# Patient Record
Sex: MALE | Race: WHITE | Employment: OTHER | URBAN - METROPOLITAN AREA
[De-identification: names, ages, dates, MRNs, and addresses within clinical notes are randomized per-mention and may not be internally consistent; named-entity substitution may affect disease eponyms.]

---

## 2018-11-08 ENCOUNTER — HOSPITAL ENCOUNTER (OUTPATIENT)
Age: 73
Setting detail: OUTPATIENT SURGERY
Discharge: HOME | DRG: 862 | End: 2018-11-08
Attending: UROLOGY | Admitting: UROLOGY
Payer: MEDICARE

## 2018-11-08 ENCOUNTER — HOSPITAL ENCOUNTER (OUTPATIENT)
Dept: ULTRASOUND IMAGING | Age: 73
Discharge: HOME | DRG: 862 | End: 2018-11-10
Payer: MEDICARE

## 2018-11-08 VITALS
WEIGHT: 200.62 LBS | SYSTOLIC BLOOD PRESSURE: 159 MMHG | HEIGHT: 69 IN | RESPIRATION RATE: 16 BRPM | HEART RATE: 96 BPM | OXYGEN SATURATION: 96 % | BODY MASS INDEX: 29.71 KG/M2 | TEMPERATURE: 98.2 F | DIASTOLIC BLOOD PRESSURE: 68 MMHG

## 2018-11-08 DIAGNOSIS — R97.20 ELEVATED PSA: ICD-10-CM

## 2018-11-08 PROCEDURE — 3600000002 HC SURGERY LEVEL 2 BASE: Performed by: UROLOGY

## 2018-11-08 PROCEDURE — 2709999900 HC NON-CHARGEABLE SUPPLY: Performed by: UROLOGY

## 2018-11-08 PROCEDURE — 88305 TISSUE EXAM BY PATHOLOGIST: CPT

## 2018-11-08 PROCEDURE — 7100000010 HC PHASE II RECOVERY - FIRST 15 MIN: Performed by: UROLOGY

## 2018-11-08 PROCEDURE — 3600000012 HC SURGERY LEVEL 2 ADDTL 15MIN: Performed by: UROLOGY

## 2018-11-08 PROCEDURE — 2500000003 HC RX 250 WO HCPCS: Performed by: UROLOGY

## 2018-11-08 PROCEDURE — 7100000011 HC PHASE II RECOVERY - ADDTL 15 MIN: Performed by: UROLOGY

## 2018-11-08 PROCEDURE — 55700 US BIOPSY PROSTATE NEEDLE/PUNCH: CPT

## 2018-11-08 PROCEDURE — 0VB03ZX EXCISION OF PROSTATE, PERCUTANEOUS APPROACH, DIAGNOSTIC: ICD-10-PCS | Performed by: UROLOGY

## 2018-11-08 PROCEDURE — 6360000002 HC RX W HCPCS: Performed by: UROLOGY

## 2018-11-08 RX ORDER — GENTAMICIN SULFATE 40 MG/ML
80 INJECTION, SOLUTION INTRAMUSCULAR; INTRAVENOUS ONCE
Status: COMPLETED | OUTPATIENT
Start: 2018-11-08 | End: 2018-11-08

## 2018-11-08 RX ORDER — DOXAZOSIN 2 MG/1
TABLET ORAL
COMMUNITY

## 2018-11-08 RX ORDER — LIDOCAINE HYDROCHLORIDE 10 MG/ML
INJECTION, SOLUTION EPIDURAL; INFILTRATION; INTRACAUDAL; PERINEURAL PRN
Status: DISCONTINUED | OUTPATIENT
Start: 2018-11-08 | End: 2018-11-08 | Stop reason: HOSPADM

## 2018-11-08 RX ORDER — ATORVASTATIN CALCIUM 10 MG/1
TABLET, FILM COATED ORAL
COMMUNITY

## 2018-11-08 RX ADMIN — GENTAMICIN SULFATE 80 MG: 40 INJECTION, SOLUTION INTRAMUSCULAR; INTRAVENOUS at 09:56

## 2018-11-08 ASSESSMENT — PAIN SCALES - GENERAL: PAINLEVEL_OUTOF10: 0

## 2018-11-08 ASSESSMENT — PAIN - FUNCTIONAL ASSESSMENT: PAIN_FUNCTIONAL_ASSESSMENT: 0-10

## 2018-11-08 NOTE — OP NOTE
FACILITY:  [address] [place] [address]  DATE:  11/08/18  Chrissy Mendoza  1945  [id number]     Surgeon: Dr. Hunter Monaco MD  Assistant: none  Preoperative diagnosis: Elevated prostatic specific antigen. Postoperative diagnosis: Elevated prostatic specific antigen. Procedure: Transrectal ultrasound of the prostate with prostate biopsy. Anesthesia: MAC and local  Specimen: Prostate biopsy specimens  COMPLICATIONS:  None. Drains: None  Follow-up: 1-2 weeks for pathology    Indication:  Chrissy Mendoza is a 71 y.o. male  with PSA of 15.61. He is here today for biopsy of the prostate. Risks benefits and alternatives goals and possible complications of the procedure were explained to the patient for consent was obtained. He elected to proceed. Details: Patient was brought back to the operating room table. He was laid  in the supine position. EPC cuffs were placed on and functioning prior to  induction of anesthesia. Anesthesia was inducted. A timeout performed. The ultrasound probe was placed per rectum. The prostate was brought into view. His prostate was measured at 88 gm. Seminal vesicles appeared  normal.  We used 1 percent lidocaine to inject around the neurovascular  bundle bilaterally. We then proceeded with a standard sextant biopsy  starting on the right side in the left side for a total of 12 specimens. There was small hypoechoic nodule on right side. These core specimens were sent off for permanent pathology. After completion of the biopsy we then removed  the ultrasound probe. There is no evidence of brisk bleeding per the  rectum. The patient tolerated the procedure well. His anesthesia was  reversed. He was then taken to recovery in stable condition. He was  discharged home in stable condition and instructed to follow-up for review  of his pathology results. He is instructed to call if he has any fevers  shaking chills.   The attending was present for

## 2018-11-09 ENCOUNTER — APPOINTMENT (OUTPATIENT)
Dept: GENERAL RADIOLOGY | Age: 73
DRG: 862 | End: 2018-11-09
Payer: MEDICARE

## 2018-11-09 ENCOUNTER — HOSPITAL ENCOUNTER (INPATIENT)
Age: 73
LOS: 3 days | Discharge: HOME HEALTH | DRG: 862 | End: 2018-11-12
Attending: EMERGENCY MEDICINE | Admitting: FAMILY MEDICINE
Payer: MEDICARE

## 2018-11-09 DIAGNOSIS — R78.81 BACTEREMIA: Primary | ICD-10-CM

## 2018-11-09 DIAGNOSIS — N17.9 ACUTE KIDNEY INJURY (HCC): ICD-10-CM

## 2018-11-09 PROBLEM — T81.44XA: Status: ACTIVE | Noted: 2018-11-09

## 2018-11-09 PROBLEM — E87.20 LACTIC ACIDOSIS: Status: ACTIVE | Noted: 2018-11-09

## 2018-11-09 LAB
-: ABNORMAL
ABSOLUTE EOS #: 0 K/UL (ref 0–0.4)
ABSOLUTE IMMATURE GRANULOCYTE: ABNORMAL K/UL (ref 0–0.3)
ABSOLUTE LYMPH #: 0.5 K/UL (ref 1–4.8)
ABSOLUTE MONO #: 0 K/UL (ref 0.2–0.8)
ALBUMIN SERPL-MCNC: 4.7 G/DL (ref 3.5–5.2)
ALBUMIN/GLOBULIN RATIO: NORMAL (ref 1–2.5)
ALP BLD-CCNC: 60 U/L (ref 40–129)
ALT SERPL-CCNC: 17 U/L (ref 5–41)
AMORPHOUS: ABNORMAL
ANION GAP SERPL CALCULATED.3IONS-SCNC: 17 MMOL/L (ref 9–17)
AST SERPL-CCNC: 20 U/L
BACTERIA: ABNORMAL
BASOPHILS # BLD: 0 % (ref 0–2)
BASOPHILS ABSOLUTE: 0 K/UL (ref 0–0.2)
BILIRUB SERPL-MCNC: 0.8 MG/DL (ref 0.3–1.2)
BILIRUBIN DIRECT: 0.18 MG/DL
BILIRUBIN URINE: NEGATIVE
BILIRUBIN, INDIRECT: 0.62 MG/DL (ref 0–1)
BUN BLDV-MCNC: 26 MG/DL (ref 8–23)
BUN/CREAT BLD: 15 (ref 9–20)
CALCIUM SERPL-MCNC: 9.1 MG/DL (ref 8.6–10.4)
CASTS UA: ABNORMAL /LPF
CHLORIDE BLD-SCNC: 94 MMOL/L (ref 98–107)
CO2: 21 MMOL/L (ref 20–31)
COLOR: YELLOW
COMMENT UA: ABNORMAL
CREAT SERPL-MCNC: 1.73 MG/DL (ref 0.7–1.2)
CRYSTALS, UA: ABNORMAL /HPF
DIFFERENTIAL TYPE: ABNORMAL
EKG ATRIAL RATE: 128 BPM
EKG P AXIS: 49 DEGREES
EKG P-R INTERVAL: 150 MS
EKG Q-T INTERVAL: 298 MS
EKG QRS DURATION: 100 MS
EKG QTC CALCULATION (BAZETT): 435 MS
EKG R AXIS: -73 DEGREES
EKG T AXIS: 82 DEGREES
EKG VENTRICULAR RATE: 128 BPM
EOSINOPHILS RELATIVE PERCENT: 0 % (ref 1–4)
EPITHELIAL CELLS UA: ABNORMAL /HPF (ref 0–5)
GFR AFRICAN AMERICAN: 47 ML/MIN
GFR NON-AFRICAN AMERICAN: 39 ML/MIN
GFR SERPL CREATININE-BSD FRML MDRD: ABNORMAL ML/MIN/{1.73_M2}
GFR SERPL CREATININE-BSD FRML MDRD: ABNORMAL ML/MIN/{1.73_M2}
GLOBULIN: NORMAL G/DL (ref 1.5–3.8)
GLUCOSE BLD-MCNC: 163 MG/DL (ref 70–99)
GLUCOSE URINE: NEGATIVE
HCT VFR BLD CALC: 43.2 % (ref 41–53)
HEMOGLOBIN: 15 G/DL (ref 13.5–17.5)
IMMATURE GRANULOCYTES: ABNORMAL %
KETONES, URINE: NEGATIVE
LACTIC ACID, SEPSIS WHOLE BLOOD: ABNORMAL MMOL/L (ref 0.5–1.9)
LACTIC ACID, SEPSIS WHOLE BLOOD: NORMAL MMOL/L (ref 0.5–1.9)
LACTIC ACID, SEPSIS: 1.2 MMOL/L (ref 0.5–1.9)
LACTIC ACID, SEPSIS: 4.6 MMOL/L (ref 0.5–1.9)
LEUKOCYTE ESTERASE, URINE: ABNORMAL
LYMPHOCYTES # BLD: 4 % (ref 24–44)
MCH RBC QN AUTO: 30.1 PG (ref 26–34)
MCHC RBC AUTO-ENTMCNC: 34.7 G/DL (ref 31–37)
MCV RBC AUTO: 86.7 FL (ref 80–100)
MONOCYTES # BLD: 0 % (ref 1–7)
MUCUS: ABNORMAL
NITRITE, URINE: NEGATIVE
NRBC AUTOMATED: ABNORMAL PER 100 WBC
OTHER OBSERVATIONS UA: ABNORMAL
PDW BLD-RTO: 14 % (ref 11.5–14.5)
PH UA: 5 (ref 5–8)
PLATELET # BLD: 200 K/UL (ref 130–400)
PLATELET ESTIMATE: ABNORMAL
PMV BLD AUTO: 8.2 FL (ref 6–12)
POTASSIUM SERPL-SCNC: 4.2 MMOL/L (ref 3.7–5.3)
PROTEIN UA: ABNORMAL
RBC # BLD: 4.99 M/UL (ref 4.5–5.9)
RBC # BLD: ABNORMAL 10*6/UL
RBC UA: ABNORMAL /HPF (ref 0–2)
RENAL EPITHELIAL, UA: ABNORMAL /HPF
SEG NEUTROPHILS: 96 % (ref 36–66)
SEGMENTED NEUTROPHILS ABSOLUTE COUNT: 12 K/UL (ref 1.8–7.7)
SODIUM BLD-SCNC: 132 MMOL/L (ref 135–144)
SPECIFIC GRAVITY UA: 1.03 (ref 1–1.03)
SURGICAL PATHOLOGY REPORT: NORMAL
TOTAL PROTEIN: 7 G/DL (ref 6.4–8.3)
TRICHOMONAS: ABNORMAL
TROPONIN INTERP: NORMAL
TROPONIN T: <0.03 NG/ML
TURBIDITY: CLEAR
URINE HGB: ABNORMAL
UROBILINOGEN, URINE: NORMAL
WBC # BLD: 12.6 K/UL (ref 3.5–11)
WBC # BLD: ABNORMAL 10*3/UL
WBC UA: ABNORMAL /HPF (ref 0–5)
YEAST: ABNORMAL

## 2018-11-09 PROCEDURE — 83605 ASSAY OF LACTIC ACID: CPT

## 2018-11-09 PROCEDURE — 6370000000 HC RX 637 (ALT 250 FOR IP): Performed by: FAMILY MEDICINE

## 2018-11-09 PROCEDURE — 80076 HEPATIC FUNCTION PANEL: CPT

## 2018-11-09 PROCEDURE — 96375 TX/PRO/DX INJ NEW DRUG ADDON: CPT

## 2018-11-09 PROCEDURE — 6360000002 HC RX W HCPCS: Performed by: EMERGENCY MEDICINE

## 2018-11-09 PROCEDURE — 96361 HYDRATE IV INFUSION ADD-ON: CPT

## 2018-11-09 PROCEDURE — 2580000003 HC RX 258: Performed by: NURSE PRACTITIONER

## 2018-11-09 PROCEDURE — 87086 URINE CULTURE/COLONY COUNT: CPT

## 2018-11-09 PROCEDURE — 87088 URINE BACTERIA CULTURE: CPT

## 2018-11-09 PROCEDURE — 87040 BLOOD CULTURE FOR BACTERIA: CPT

## 2018-11-09 PROCEDURE — 2580000003 HC RX 258: Performed by: FAMILY MEDICINE

## 2018-11-09 PROCEDURE — 99285 EMERGENCY DEPT VISIT HI MDM: CPT

## 2018-11-09 PROCEDURE — 6360000002 HC RX W HCPCS: Performed by: FAMILY MEDICINE

## 2018-11-09 PROCEDURE — 87149 DNA/RNA DIRECT PROBE: CPT

## 2018-11-09 PROCEDURE — 1200000000 HC SEMI PRIVATE

## 2018-11-09 PROCEDURE — 36415 COLL VENOUS BLD VENIPUNCTURE: CPT

## 2018-11-09 PROCEDURE — 85025 COMPLETE CBC W/AUTO DIFF WBC: CPT

## 2018-11-09 PROCEDURE — 84484 ASSAY OF TROPONIN QUANT: CPT

## 2018-11-09 PROCEDURE — 6360000002 HC RX W HCPCS: Performed by: NURSE PRACTITIONER

## 2018-11-09 PROCEDURE — 87205 SMEAR GRAM STAIN: CPT

## 2018-11-09 PROCEDURE — 93005 ELECTROCARDIOGRAM TRACING: CPT

## 2018-11-09 PROCEDURE — 80048 BASIC METABOLIC PNL TOTAL CA: CPT

## 2018-11-09 PROCEDURE — 71045 X-RAY EXAM CHEST 1 VIEW: CPT

## 2018-11-09 PROCEDURE — 99223 1ST HOSP IP/OBS HIGH 75: CPT | Performed by: FAMILY MEDICINE

## 2018-11-09 PROCEDURE — 96365 THER/PROPH/DIAG IV INF INIT: CPT

## 2018-11-09 PROCEDURE — 2580000003 HC RX 258: Performed by: EMERGENCY MEDICINE

## 2018-11-09 PROCEDURE — 87186 SC STD MICRODIL/AGAR DIL: CPT

## 2018-11-09 PROCEDURE — 81001 URINALYSIS AUTO W/SCOPE: CPT

## 2018-11-09 PROCEDURE — 6370000000 HC RX 637 (ALT 250 FOR IP): Performed by: NURSE PRACTITIONER

## 2018-11-09 RX ORDER — CIPROFLOXACIN 2 MG/ML
400 INJECTION, SOLUTION INTRAVENOUS EVERY 12 HOURS
Status: DISCONTINUED | OUTPATIENT
Start: 2018-11-09 | End: 2018-11-11

## 2018-11-09 RX ORDER — SODIUM CHLORIDE 9 MG/ML
INJECTION, SOLUTION INTRAVENOUS CONTINUOUS
Status: DISCONTINUED | OUTPATIENT
Start: 2018-11-09 | End: 2018-11-12 | Stop reason: HOSPADM

## 2018-11-09 RX ORDER — SODIUM CHLORIDE 0.9 % (FLUSH) 0.9 %
10 SYRINGE (ML) INJECTION EVERY 12 HOURS SCHEDULED
Status: DISCONTINUED | OUTPATIENT
Start: 2018-11-09 | End: 2018-11-09

## 2018-11-09 RX ORDER — 0.9 % SODIUM CHLORIDE 0.9 %
30 INTRAVENOUS SOLUTION INTRAVENOUS ONCE
Status: COMPLETED | OUTPATIENT
Start: 2018-11-09 | End: 2018-11-09

## 2018-11-09 RX ORDER — B-COMPLEX WITH VITAMIN C
1 TABLET ORAL DAILY
COMMUNITY

## 2018-11-09 RX ORDER — SODIUM CHLORIDE 0.9 % (FLUSH) 0.9 %
10 SYRINGE (ML) INJECTION EVERY 12 HOURS SCHEDULED
Status: DISCONTINUED | OUTPATIENT
Start: 2018-11-09 | End: 2018-11-12 | Stop reason: HOSPADM

## 2018-11-09 RX ORDER — SULFAMETHOXAZOLE AND TRIMETHOPRIM 400; 80 MG/1; MG/1
1 TABLET ORAL 2 TIMES DAILY
Status: ON HOLD | COMMUNITY
End: 2018-11-12 | Stop reason: HOSPADM

## 2018-11-09 RX ORDER — ACETAMINOPHEN 500 MG
1000 TABLET ORAL ONCE
Status: COMPLETED | OUTPATIENT
Start: 2018-11-09 | End: 2018-11-09

## 2018-11-09 RX ORDER — ACETAMINOPHEN 325 MG/1
650 TABLET ORAL EVERY 4 HOURS PRN
Status: DISCONTINUED | OUTPATIENT
Start: 2018-11-09 | End: 2018-11-12 | Stop reason: HOSPADM

## 2018-11-09 RX ORDER — SODIUM CHLORIDE 0.9 % (FLUSH) 0.9 %
10 SYRINGE (ML) INJECTION PRN
Status: DISCONTINUED | OUTPATIENT
Start: 2018-11-09 | End: 2018-11-09

## 2018-11-09 RX ORDER — 0.9 % SODIUM CHLORIDE 0.9 %
1000 INTRAVENOUS SOLUTION INTRAVENOUS ONCE
Status: DISCONTINUED | OUTPATIENT
Start: 2018-11-09 | End: 2018-11-09

## 2018-11-09 RX ORDER — SODIUM CHLORIDE 0.9 % (FLUSH) 0.9 %
10 SYRINGE (ML) INJECTION PRN
Status: DISCONTINUED | OUTPATIENT
Start: 2018-11-09 | End: 2018-11-12 | Stop reason: HOSPADM

## 2018-11-09 RX ORDER — ATORVASTATIN CALCIUM 10 MG/1
10 TABLET, FILM COATED ORAL NIGHTLY
Status: DISCONTINUED | OUTPATIENT
Start: 2018-11-09 | End: 2018-11-12 | Stop reason: HOSPADM

## 2018-11-09 RX ORDER — ONDANSETRON 2 MG/ML
4 INJECTION INTRAMUSCULAR; INTRAVENOUS ONCE
Status: COMPLETED | OUTPATIENT
Start: 2018-11-09 | End: 2018-11-09

## 2018-11-09 RX ORDER — CIPROFLOXACIN 500 MG/1
500 TABLET, FILM COATED ORAL 2 TIMES DAILY
Status: ON HOLD | COMMUNITY
End: 2018-11-12 | Stop reason: HOSPADM

## 2018-11-09 RX ADMIN — ENOXAPARIN SODIUM 40 MG: 40 INJECTION SUBCUTANEOUS at 20:32

## 2018-11-09 RX ADMIN — CEFEPIME HYDROCHLORIDE 2 G: 2 INJECTION, POWDER, FOR SOLUTION INTRAVENOUS at 15:10

## 2018-11-09 RX ADMIN — CIPROFLOXACIN 400 MG: 2 INJECTION, SOLUTION INTRAVENOUS at 20:29

## 2018-11-09 RX ADMIN — VANCOMYCIN HYDROCHLORIDE 2000 MG: 1 INJECTION, POWDER, LYOPHILIZED, FOR SOLUTION INTRAVENOUS at 15:38

## 2018-11-09 RX ADMIN — Medication 10 ML: at 20:33

## 2018-11-09 RX ADMIN — SODIUM CHLORIDE 2121 ML: 9 INJECTION, SOLUTION INTRAVENOUS at 14:34

## 2018-11-09 RX ADMIN — ONDANSETRON 4 MG: 2 INJECTION, SOLUTION INTRAMUSCULAR; INTRAVENOUS at 14:34

## 2018-11-09 RX ADMIN — ATORVASTATIN CALCIUM 10 MG: 10 TABLET, FILM COATED ORAL at 20:29

## 2018-11-09 RX ADMIN — SODIUM CHLORIDE: 9 INJECTION, SOLUTION INTRAVENOUS at 17:58

## 2018-11-09 RX ADMIN — ACETAMINOPHEN 1000 MG: 500 TABLET ORAL at 14:34

## 2018-11-09 ASSESSMENT — ENCOUNTER SYMPTOMS
NAUSEA: 0
BLOOD IN STOOL: 0
COUGH: 0
BACK PAIN: 0
WHEEZING: 0
VOICE CHANGE: 0
ABDOMINAL PAIN: 0
VOMITING: 0
SORE THROAT: 0
CONSTIPATION: 0
DIARRHEA: 0
SINUS PRESSURE: 0
EYE PAIN: 0
COLOR CHANGE: 0
SHORTNESS OF BREATH: 0

## 2018-11-09 ASSESSMENT — PAIN DESCRIPTION - LOCATION
LOCATION: BACK;GENERALIZED;OTHER (COMMENT)
LOCATION: BACK

## 2018-11-09 ASSESSMENT — PAIN DESCRIPTION - PAIN TYPE: TYPE: ACUTE PAIN

## 2018-11-09 ASSESSMENT — PAIN SCALES - GENERAL
PAINLEVEL_OUTOF10: 5
PAINLEVEL_OUTOF10: 0
PAINLEVEL_OUTOF10: 0

## 2018-11-09 NOTE — H&P
[address]      HISTORY AND PHYSICAL EXAMINATION            Date:   11/9/2018  Patient name:  Bert Estrada  Date of admission:  11/9/2018  1:29 PM  MRN:   [id number]  Account:  [de-identified]  YOB: 1945  PCP:    Nuzhat Valles MD  Room:   2003/2003-02  Code Status:    Full CodeFull code    Chief Complaint:     Chief Complaint   Patient presents with    Dizziness     started  this morning    Back Pain    Nausea    Emesis       History Obtained From:     patient, spouse, electronic medical record    History of Present Illness: The patient is a 71 y.o. Non-/non  male who presents with Dizziness (started  this morning); Back Pain; Nausea; and Emesis   and he is admitted to the hospital for the management of  Sepsis following a procedure, initial encounter. Patient came to emergency room with fatigue, dizziness, nausea, vomiting for 1 day. He had prostate biopsy yesterday for elevated PSA of 17 for diagnosis. Patient started having chills, fever, dizziness, generalized weakness yesterday evening after the procedure. Patient was also having difficulty in breathing. Initial evaluation showed temperature 102.1 Fahrenheit, tachycardia 139, hypotension 100/45,lactic acid 4.6, BUN 26, creatinine 1.73, hyponatremia 132, leukocytosis 12.6. Patient is otherwise healthy and does not have significant past medical history. Past Medical History:     Past Medical History:   Diagnosis Date    Enlarged prostate     Hyperlipidemia         Past Surgical History:     Past Surgical History:   Procedure Laterality Date    CARDIAC CATHETERIZATION      LEG SURGERY Left     muscle    MS BIOPSY OF PROSTATE,INCISIONAL N/A 11/8/2018    PROSTATE BIOPSY TRANSRECTAL WITH US performed by Nilsa Darby MD at [facility]        Medications Prior to Admission:     Prior to Admission medications    Medication Sig Start Date End Date Taking?  Authorizing Provider Axis 82 degrees   Lactate, Sepsis    Collection Time: 11/09/18  2:20 PM   Result Value Ref Range    Lactic Acid, Sepsis 4.6 (H) 0.5 - 1.9 mmol/L    Lactic Acid, Sepsis, Whole Blood NOT REPORTED 0.5 - 1.9 mmol/L   CBC Auto Differential    Collection Time: 11/09/18  2:20 PM   Result Value Ref Range    WBC 12.6 (H) 3.5 - 11.0 k/uL    RBC 4.99 4.5 - 5.9 m/uL    Hemoglobin 15.0 13.5 - 17.5 g/dL    Hematocrit 43.2 41 - 53 %    MCV 86.7 80 - 100 fL    MCH 30.1 26 - 34 pg    MCHC 34.7 31 - 37 g/dL    RDW 14.0 11.5 - 14.5 %    Platelets 138 147 - 706 k/uL    MPV 8.2 6.0 - 12.0 fL    NRBC Automated NOT REPORTED per 100 WBC    Differential Type NOT REPORTED     Immature Granulocytes NOT REPORTED 0 %    Absolute Immature Granulocyte NOT REPORTED 0.00 - 0.30 k/uL    WBC Morphology NOT REPORTED     RBC Morphology NOT REPORTED     Platelet Estimate NOT REPORTED     Seg Neutrophils 96 (H) 36 - 66 %    Lymphocytes 4 (L) 24 - 44 %    Monocytes 0 (L) 1 - 7 %    Eosinophils % 0 (L) 1 - 4 %    Basophils 0 0 - 2 %    Segs Absolute 12.00 (H) 1.8 - 7.7 k/uL    Absolute Lymph # 0.50 (L) 1.0 - 4.8 k/uL    Absolute Mono # 0.00 (L) 0.2 - 0.8 k/uL    Absolute Eos # 0.00 0.0 - 0.4 k/uL    Basophils # 0.00 0.0 - 0.2 k/uL   Basic Metabolic Panel    Collection Time: 11/09/18  2:20 PM   Result Value Ref Range    Glucose 163 (H) 70 - 99 mg/dL    BUN 26 (H) 8 - 23 mg/dL    CREATININE 1.73 (H) 0.70 - 1.20 mg/dL    Bun/Cre Ratio 15 9 - 20    Calcium 9.1 8.6 - 10.4 mg/dL    Sodium 132 (L) 135 - 144 mmol/L    Potassium 4.2 3.7 - 5.3 mmol/L    Chloride 94 (L) 98 - 107 mmol/L    CO2 21 20 - 31 mmol/L    Anion Gap 17 9 - 17 mmol/L    GFR Non-African American 39 (L) >60 mL/min    GFR  47 (L) >60 mL/min    GFR Comment          GFR Staging NOT REPORTED    Hepatic Function Panel    Collection Time: 11/09/18  2:20 PM   Result Value Ref Range    Alb 4.7 3.5 - 5.2 g/dL    Alkaline Phosphatase 60 40 - 129 U/L    ALT 17 5 - 41 U/L    AST 20 <40 U/L

## 2018-11-10 PROBLEM — A41.51 E. COLI SEPSIS (HCC): Status: ACTIVE | Noted: 2018-11-10

## 2018-11-10 LAB
ALBUMIN SERPL-MCNC: 3.6 G/DL (ref 3.5–5.2)
ALBUMIN/GLOBULIN RATIO: ABNORMAL (ref 1–2.5)
ALP BLD-CCNC: 47 U/L (ref 40–129)
ALT SERPL-CCNC: 23 U/L (ref 5–41)
ANION GAP SERPL CALCULATED.3IONS-SCNC: 13 MMOL/L (ref 9–17)
AST SERPL-CCNC: 53 U/L
BILIRUB SERPL-MCNC: 0.7 MG/DL (ref 0.3–1.2)
BUN BLDV-MCNC: 21 MG/DL (ref 8–23)
BUN/CREAT BLD: 16 (ref 9–20)
CALCIUM IONIZED: 1.16 MMOL/L (ref 1.13–1.33)
CALCIUM SERPL-MCNC: 7.9 MG/DL (ref 8.6–10.4)
CHLORIDE BLD-SCNC: 102 MMOL/L (ref 98–107)
CO2: 21 MMOL/L (ref 20–31)
CREAT SERPL-MCNC: 1.3 MG/DL (ref 0.7–1.2)
GFR AFRICAN AMERICAN: >60 ML/MIN
GFR NON-AFRICAN AMERICAN: 54 ML/MIN
GFR SERPL CREATININE-BSD FRML MDRD: ABNORMAL ML/MIN/{1.73_M2}
GFR SERPL CREATININE-BSD FRML MDRD: ABNORMAL ML/MIN/{1.73_M2}
GLUCOSE BLD-MCNC: 142 MG/DL (ref 70–99)
HCT VFR BLD CALC: 34.7 % (ref 41–53)
HEMOGLOBIN: 12 G/DL (ref 13.5–17.5)
INR BLD: 1.2
LACTIC ACID, SEPSIS WHOLE BLOOD: ABNORMAL MMOL/L (ref 0.5–1.9)
LACTIC ACID, SEPSIS WHOLE BLOOD: NORMAL MMOL/L (ref 0.5–1.9)
LACTIC ACID, SEPSIS: 0.9 MMOL/L (ref 0.5–1.9)
LACTIC ACID, SEPSIS: 2.5 MMOL/L (ref 0.5–1.9)
MAGNESIUM: 1.7 MG/DL (ref 1.6–2.6)
MCH RBC QN AUTO: 30 PG (ref 26–34)
MCHC RBC AUTO-ENTMCNC: 34.5 G/DL (ref 31–37)
MCV RBC AUTO: 87.1 FL (ref 80–100)
NRBC AUTOMATED: ABNORMAL PER 100 WBC
PDW BLD-RTO: 14.3 % (ref 11.5–14.5)
PHOSPHORUS: 2 MG/DL (ref 2.5–4.5)
PLATELET # BLD: 159 K/UL (ref 130–400)
PMV BLD AUTO: 8.3 FL (ref 6–12)
POTASSIUM SERPL-SCNC: 4.3 MMOL/L (ref 3.7–5.3)
PROTHROMBIN TIME: 12.3 SEC (ref 9.7–11.6)
RBC # BLD: 3.98 M/UL (ref 4.5–5.9)
SODIUM BLD-SCNC: 136 MMOL/L (ref 135–144)
TOTAL PROTEIN: 5.9 G/DL (ref 6.4–8.3)
TROPONIN INTERP: NORMAL
TROPONIN T: <0.03 NG/ML
WBC # BLD: 13.1 K/UL (ref 3.5–11)

## 2018-11-10 PROCEDURE — 1200000000 HC SEMI PRIVATE

## 2018-11-10 PROCEDURE — 99232 SBSQ HOSP IP/OBS MODERATE 35: CPT | Performed by: FAMILY MEDICINE

## 2018-11-10 PROCEDURE — 83735 ASSAY OF MAGNESIUM: CPT

## 2018-11-10 PROCEDURE — 6360000002 HC RX W HCPCS: Performed by: FAMILY MEDICINE

## 2018-11-10 PROCEDURE — 2580000003 HC RX 258: Performed by: FAMILY MEDICINE

## 2018-11-10 PROCEDURE — 85027 COMPLETE CBC AUTOMATED: CPT

## 2018-11-10 PROCEDURE — 85610 PROTHROMBIN TIME: CPT

## 2018-11-10 PROCEDURE — 82330 ASSAY OF CALCIUM: CPT

## 2018-11-10 PROCEDURE — 84100 ASSAY OF PHOSPHORUS: CPT

## 2018-11-10 PROCEDURE — 84484 ASSAY OF TROPONIN QUANT: CPT

## 2018-11-10 PROCEDURE — 80053 COMPREHEN METABOLIC PANEL: CPT

## 2018-11-10 PROCEDURE — 6370000000 HC RX 637 (ALT 250 FOR IP): Performed by: FAMILY MEDICINE

## 2018-11-10 PROCEDURE — 83605 ASSAY OF LACTIC ACID: CPT

## 2018-11-10 PROCEDURE — 36415 COLL VENOUS BLD VENIPUNCTURE: CPT

## 2018-11-10 RX ADMIN — CIPROFLOXACIN 400 MG: 2 INJECTION, SOLUTION INTRAVENOUS at 20:22

## 2018-11-10 RX ADMIN — ENOXAPARIN SODIUM 40 MG: 40 INJECTION SUBCUTANEOUS at 09:07

## 2018-11-10 RX ADMIN — ACETAMINOPHEN 650 MG: 325 TABLET ORAL at 15:34

## 2018-11-10 RX ADMIN — ATORVASTATIN CALCIUM 10 MG: 10 TABLET, FILM COATED ORAL at 20:22

## 2018-11-10 RX ADMIN — CEFTRIAXONE SODIUM 1 G: 1 INJECTION, POWDER, FOR SOLUTION INTRAMUSCULAR; INTRAVENOUS at 15:18

## 2018-11-10 RX ADMIN — SODIUM CHLORIDE: 9 INJECTION, SOLUTION INTRAVENOUS at 01:35

## 2018-11-10 RX ADMIN — CIPROFLOXACIN 400 MG: 2 INJECTION, SOLUTION INTRAVENOUS at 09:07

## 2018-11-10 RX ADMIN — SODIUM CHLORIDE: 9 INJECTION, SOLUTION INTRAVENOUS at 17:55

## 2018-11-10 ASSESSMENT — ENCOUNTER SYMPTOMS
SINUS PRESSURE: 0
VOICE CHANGE: 0
COUGH: 0
VOMITING: 0
BLOOD IN STOOL: 0
WHEEZING: 0
ABDOMINAL PAIN: 0
NAUSEA: 0
DIARRHEA: 0
SHORTNESS OF BREATH: 0
CONSTIPATION: 0
SORE THROAT: 0

## 2018-11-10 ASSESSMENT — PAIN SCALES - GENERAL
PAINLEVEL_OUTOF10: 0

## 2018-11-10 NOTE — CARE COORDINATION
Case Management Initial Discharge Plan  Nicole Lakshmi,         Readmission Risk              Risk of Unplanned Readmission:        13             Met with:spouse/SO to discuss discharge plans. Information verified: address, contacts, phone number, , insurance Yes  PCP: Jeff Purvis MD  Date of last visit:  2 months    Insurance Provider: medicare and Rony Kelley 150    Discharge Planning  Current Residence:  house  Living Arrangements:  Spouse/Significant Other   Home has 1 stories/1 stairs to climb  Support Systems:  Spouse/Significant Other  Current Services PTA:   none Agency:  none  Patient able to perform ADL's:Independent  DME in home:   none  DME used to aid ambulation prior to admission:    none  DME used during admission:   none    Potential Assistance Needed:  N/A    Pharmacy: [facility] Medications:  No  Does patient want to participate in local refill/ meds to beds program?  No    Patient agreeable to home care: No  [place] of choice provided:  n/a      Type of Home Care Services:  None  Patient expects to be discharged to:  home    Prior SNF/Rehab Placement and Facility:  none  Agreeable to SNF/Rehab: No  [place] of choice provided: n/a   Evaluation: no    Expected Discharge date:  18  Follow Up Appointment: Best Day/ Time:  (anytime)    Transportation provider: self  Transportation arrangements needed for discharge: No    Discharge Plan: Met with pt at bedside. Pt admitted with UTI sepsis following biopsy of prostate. Pt lives with spouse and is independent at home. No use of any DME. Pt is an active . Pt will dc home. Await culture for ATB. No dc needs anticipated. Follow  POC for ATB.         Electronically signed by Yazan Jhaveri RN on 11/10/18 at 4:52 PM

## 2018-11-11 LAB
BUN BLDV-MCNC: 13 MG/DL (ref 8–23)
CREAT SERPL-MCNC: 1 MG/DL (ref 0.7–1.2)
CULTURE: ABNORMAL
GFR AFRICAN AMERICAN: >60 ML/MIN
GFR NON-AFRICAN AMERICAN: >60 ML/MIN
GFR SERPL CREATININE-BSD FRML MDRD: NORMAL ML/MIN/{1.73_M2}
GFR SERPL CREATININE-BSD FRML MDRD: NORMAL ML/MIN/{1.73_M2}
HCT VFR BLD CALC: 34.3 % (ref 41–53)
HEMOGLOBIN: 11.8 G/DL (ref 13.5–17.5)
Lab: ABNORMAL
Lab: ABNORMAL
MCH RBC QN AUTO: 30.2 PG (ref 26–34)
MCHC RBC AUTO-ENTMCNC: 34.5 G/DL (ref 31–37)
MCV RBC AUTO: 87.4 FL (ref 80–100)
NRBC AUTOMATED: ABNORMAL PER 100 WBC
ORGANISM: ABNORMAL
ORGANISM: ABNORMAL
PDW BLD-RTO: 14.3 % (ref 11.5–14.5)
PLATELET # BLD: 132 K/UL (ref 130–400)
PMV BLD AUTO: 7.9 FL (ref 6–12)
RBC # BLD: 3.92 M/UL (ref 4.5–5.9)
SPECIMEN DESCRIPTION: ABNORMAL
SPECIMEN DESCRIPTION: ABNORMAL
STATUS: ABNORMAL
STATUS: ABNORMAL
WBC # BLD: 6.1 K/UL (ref 3.5–11)

## 2018-11-11 PROCEDURE — 99232 SBSQ HOSP IP/OBS MODERATE 35: CPT | Performed by: FAMILY MEDICINE

## 2018-11-11 PROCEDURE — 1200000000 HC SEMI PRIVATE

## 2018-11-11 PROCEDURE — 82565 ASSAY OF CREATININE: CPT

## 2018-11-11 PROCEDURE — 6360000002 HC RX W HCPCS: Performed by: FAMILY MEDICINE

## 2018-11-11 PROCEDURE — 97162 PT EVAL MOD COMPLEX 30 MIN: CPT

## 2018-11-11 PROCEDURE — G8980 MOBILITY D/C STATUS: HCPCS

## 2018-11-11 PROCEDURE — 6370000000 HC RX 637 (ALT 250 FOR IP): Performed by: FAMILY MEDICINE

## 2018-11-11 PROCEDURE — 84520 ASSAY OF UREA NITROGEN: CPT

## 2018-11-11 PROCEDURE — 2580000003 HC RX 258: Performed by: FAMILY MEDICINE

## 2018-11-11 PROCEDURE — 97116 GAIT TRAINING THERAPY: CPT

## 2018-11-11 PROCEDURE — 36415 COLL VENOUS BLD VENIPUNCTURE: CPT

## 2018-11-11 PROCEDURE — 85027 COMPLETE CBC AUTOMATED: CPT

## 2018-11-11 PROCEDURE — G8978 MOBILITY CURRENT STATUS: HCPCS

## 2018-11-11 PROCEDURE — G8979 MOBILITY GOAL STATUS: HCPCS

## 2018-11-11 RX ADMIN — SODIUM CHLORIDE: 9 INJECTION, SOLUTION INTRAVENOUS at 08:01

## 2018-11-11 RX ADMIN — SODIUM CHLORIDE: 9 INJECTION, SOLUTION INTRAVENOUS at 01:42

## 2018-11-11 RX ADMIN — CIPROFLOXACIN 400 MG: 2 INJECTION, SOLUTION INTRAVENOUS at 08:01

## 2018-11-11 RX ADMIN — SODIUM CHLORIDE: 9 INJECTION, SOLUTION INTRAVENOUS at 18:57

## 2018-11-11 RX ADMIN — ENOXAPARIN SODIUM 40 MG: 40 INJECTION SUBCUTANEOUS at 08:01

## 2018-11-11 RX ADMIN — CEFTRIAXONE 2 G: 2 INJECTION, POWDER, FOR SOLUTION INTRAMUSCULAR; INTRAVENOUS at 14:57

## 2018-11-11 RX ADMIN — ATORVASTATIN CALCIUM 10 MG: 10 TABLET, FILM COATED ORAL at 21:07

## 2018-11-11 ASSESSMENT — ENCOUNTER SYMPTOMS
CONSTIPATION: 0
WHEEZING: 0
SORE THROAT: 0
BLOOD IN STOOL: 0
DIARRHEA: 0
SHORTNESS OF BREATH: 0
NAUSEA: 0
VOMITING: 0
SINUS PRESSURE: 0
COUGH: 0
ABDOMINAL PAIN: 0
VOICE CHANGE: 0

## 2018-11-11 ASSESSMENT — PAIN SCALES - GENERAL
PAINLEVEL_OUTOF10: 0

## 2018-11-11 NOTE — PROGRESS NOTES
Urology Progress Note      Subjective: Chan Bangura is a 68 y.o. male. His/Her current Diet is: DIET GENERAL;.    Since the previous note, the patient reports the following:  No acute issues overnight. Fever 5pm yesterday  Urinating without issues. No nausea or vomiting. No chest pain or shortness of breath. No calf pain. Pain Controlled. Ambulating. Tolerating PO Diet. Vitals and Labs:  Vitals:    11/10/18 2318 11/11/18 0418 11/11/18 0508 11/11/18 0718   BP: (!) 118/49 120/61  117/62   Pulse: 88 80  76   Resp: 16 16  16   Temp: 99.1 °F (37.3 °C) 99 °F (37.2 °C)  98.2 °F (36.8 °C)   TempSrc: Oral Oral  Oral   SpO2: 96% 96%  99%   Weight:   207 lb 11.2 oz (94.2 kg)    Height:         I/O last 3 completed shifts: In: 2554 [P.O.:960; I.V.:1594]  Out: 750 [Urine:750]    Recent Labs      11/09/18   1420  11/10/18   0456   WBC  12.6*  13.1*   HGB  15.0  12.0*   HCT  43.2  34.7*   MCV  86.7  87.1   PLT  200  159     Recent Labs      11/09/18   1420  11/10/18   0456   NA  132*  136   K  4.2  4.3   CL  94*  102   CO2  21  21   PHOS   --   2.0*   BUN  26*  21   CREATININE  1.73*  1.30*       Recent Labs      11/09/18   1515   COLORU  YELLOW   PHUR  5.0   WBCUA  10 TO 20   RBCUA  50    MUCUS  1+*   TRICHOMONAS  NOT REPORTED   YEAST  NOT REPORTED   BACTERIA  FEW*   SPECGRAV  1.027   LEUKOCYTESUR  SMALL*   UROBILINOGEN  Normal   BILIRUBINUR  NEGATIVE       Physical Exam:  NAD  A/O x 3  RRR  No accessory muscles of inspiration  Abdomen soft, non-tender, non-distended. No CVA tenderness. No calf pain. EPCs on. Machine turned on. Impression:    Patient Active Problem List   Diagnosis    Sepsis following a procedure, initial encounter    ALEX (acute kidney injury) ([address]    Lactic acidosis    E. coli sepsis ([address]       Plan:  Continue broad-spectrum antibiotic coverage. Blood culture is growing E. coli. Follow-up sensitivities of blood and urine cultures.   Patient will need to be discharged home

## 2018-11-11 NOTE — PROGRESS NOTES
restricted  Mobility: Walking and Moving Around Goal Status (): 0 percent impaired, limited or restricted  Mobility: Walking and Moving Around Discharge Status (): 0 percent impaired, limited or restricted  OutComes Score                                           AM-PAC Score     AM-PAC Inpatient Mobility without Stair Climbing Raw Score : 20  AM-PAC Inpatient without Stair Climbing T-Scale Score : 60.57  Mobility Inpatient CMS 0-100% Score: 0  Mobility Inpatient without Stair CMS G-Code Modifier : CH       Goals  Short term goals  Time Frame for Short term goals: 1 session  Short term goal 1: assess pt for PT needs; none at this time  Patient Goals   Patient goals : to go home ASAP       Therapy Time   Individual Concurrent Group Co-treatment   Time In 0845         Time Out 0915         Minutes 81 Willam Gilbert, PT

## 2018-11-11 NOTE — PLAN OF CARE
[address]    Second Visit Note  For more detailed information please refer to the progress note of the day      11/11/2018    5:45 PM    Name:   Tex Ramos  MRN:     [id number]     Celesteide:      [de-identified]   Room:   2003/2003-02  IP Day:  2  Admit Date:  11/9/2018  1:29 PM    PCP:   Oralia Nix MD  Code Status:  Full Code        Pt vitals were reviewed   New labs were reviewed   Patient was seen    Updated plan :     1. Discharge planning next am on IV Rocephin via mid line  . Patient and his Wife at bed side updated.          Rukhsana Castillo MD  11/11/2018  5:45 PM

## 2018-11-11 NOTE — FLOWSHEET NOTE
Patient + spouse present. Patient unavailable. Royce Melton speaks to spouse. Yousuf Michaels (spouse) states patient very ill, sick, states he will go home with a pick line.  shared in presence. Follow up as needed. 11/11/18 6197   Encounter Summary   Services provided to: Patient and family together   [facility]   Support System Spouse   Place of [address] No   Complexity of Encounter Low   Length of Encounter 15 minutes   Spiritual Assessment Completed Yes   Routine   Type Initial   Assessment Passive; Anxious   Intervention Explored feelings, thoughts, concerns; Discussed illness/injury and it's impact; Discussed belief system/Buddhist practices/abilio   Outcome Expressed gratitude

## 2018-11-12 ENCOUNTER — APPOINTMENT (OUTPATIENT)
Dept: INTERVENTIONAL RADIOLOGY/VASCULAR | Age: 73
DRG: 862 | End: 2018-11-12
Payer: MEDICARE

## 2018-11-12 VITALS
TEMPERATURE: 99 F | HEART RATE: 75 BPM | SYSTOLIC BLOOD PRESSURE: 141 MMHG | DIASTOLIC BLOOD PRESSURE: 69 MMHG | OXYGEN SATURATION: 96 % | BODY MASS INDEX: 31 KG/M2 | WEIGHT: 209.3 LBS | HEIGHT: 69 IN | RESPIRATION RATE: 16 BRPM

## 2018-11-12 LAB
ANION GAP SERPL CALCULATED.3IONS-SCNC: 11 MMOL/L (ref 9–17)
BUN BLDV-MCNC: 9 MG/DL (ref 8–23)
BUN/CREAT BLD: 10 (ref 9–20)
CALCIUM SERPL-MCNC: 8 MG/DL (ref 8.6–10.4)
CHLORIDE BLD-SCNC: 100 MMOL/L (ref 98–107)
CO2: 23 MMOL/L (ref 20–31)
CREAT SERPL-MCNC: 0.9 MG/DL (ref 0.7–1.2)
GFR AFRICAN AMERICAN: >60 ML/MIN
GFR NON-AFRICAN AMERICAN: >60 ML/MIN
GFR SERPL CREATININE-BSD FRML MDRD: ABNORMAL ML/MIN/{1.73_M2}
GFR SERPL CREATININE-BSD FRML MDRD: ABNORMAL ML/MIN/{1.73_M2}
GLUCOSE BLD-MCNC: 140 MG/DL (ref 70–99)
POTASSIUM SERPL-SCNC: 3.7 MMOL/L (ref 3.7–5.3)
SODIUM BLD-SCNC: 134 MMOL/L (ref 135–144)

## 2018-11-12 PROCEDURE — 6360000002 HC RX W HCPCS: Performed by: FAMILY MEDICINE

## 2018-11-12 PROCEDURE — 06HY33Z INSERTION OF INFUSION DEVICE INTO LOWER VEIN, PERCUTANEOUS APPROACH: ICD-10-PCS | Performed by: FAMILY MEDICINE

## 2018-11-12 PROCEDURE — 80048 BASIC METABOLIC PNL TOTAL CA: CPT

## 2018-11-12 PROCEDURE — 36569 INSJ PICC 5 YR+ W/O IMAGING: CPT

## 2018-11-12 PROCEDURE — C1751 CATH, INF, PER/CENT/MIDLINE: HCPCS

## 2018-11-12 PROCEDURE — 76937 US GUIDE VASCULAR ACCESS: CPT

## 2018-11-12 PROCEDURE — 2580000003 HC RX 258: Performed by: FAMILY MEDICINE

## 2018-11-12 PROCEDURE — 36415 COLL VENOUS BLD VENIPUNCTURE: CPT

## 2018-11-12 PROCEDURE — 99239 HOSP IP/OBS DSCHRG MGMT >30: CPT | Performed by: FAMILY MEDICINE

## 2018-11-12 RX ORDER — CEFTRIAXONE 1 G/1
2 INJECTION, POWDER, FOR SOLUTION INTRAMUSCULAR; INTRAVENOUS EVERY 24 HOURS
Qty: 20 G | Refills: 0 | Status: SHIPPED | OUTPATIENT
Start: 2018-11-12 | End: 2018-11-12 | Stop reason: HOSPADM

## 2018-11-12 RX ADMIN — CEFTRIAXONE 2 G: 2 INJECTION, POWDER, FOR SOLUTION INTRAMUSCULAR; INTRAVENOUS at 15:10

## 2018-11-12 RX ADMIN — Medication 10 ML: at 15:10

## 2018-11-12 RX ADMIN — SODIUM CHLORIDE: 9 INJECTION, SOLUTION INTRAVENOUS at 01:36

## 2018-11-12 NOTE — PROGRESS NOTES
--   4.3   --    CL  94*   --    --   102   --    CO2  21   --    --   21   --    GLUCOSE  163*   --    --   142*   --    BUN  26*   --    --   21  13   CREATININE  1.73*   --    --   1.30*  1.00   MG   --    --    --   1.7   --    ANIONGAP  17   --    --   13   --    LABGLOM  39*   --    --   54*  >60   GFRAA  47*   --    --   >60  >60   CALCIUM  9.1   --    --   7.9*   --    CAION   --    --    --   1.16   --    PHOS   --    --    --   2.0*   --    TROPONINT   --   <0.03  <0.03   --    --      Recent Labs      11/09/18   1420  11/10/18   0456   PROT  7.0  5.9*   LABALBU  4.7  3.6   AST  20  53*   ALT  17  23   ALKPHOS  60  47   BILITOT  0.80  0.70   BILIDIR  0.18   --          Lab Results   Component Value Date/Time    SPECIAL NOT REPORTED 11/09/2018 05:08 PM     Lab Results   Component Value Date/Time    CULTURE ESCHERICHIA COLI 10 to 50,000 CFU/ML (A) 11/09/2018 05:08 PM       No results found for: POCPH, PHART, PH, POCPCO2, OTK4UDY, PCO2, POCPO2, PO2ART, PO2, POCHCO3, WBV0OYX, HCO3, NBEA, PBEA, BEART, BE, THGBART, THB, DEZ5HNM, TCOV0MXF, X9SRUIKH, O2SAT, FIO2    Radiology:        Physical Examination:        General appearance:  alert, cooperative and no distress  Mental Status:  oriented to person, place and time and normal affect  Lungs:  clear to auscultation bilaterally, normal effort  Heart:  regular rate and rhythm, no murmur  Abdomen:  soft, nontender, nondistended, normal bowel sounds, no masses, hepatomegaly, splenomegaly  Extremities:  no edema, redness, tenderness in the calves  Skin:  no gross lesions, rashes, induration    Assessment:        Primary Problem  Sepsis following a procedure, initial encounter    Active Hospital Problems    Diagnosis Date Noted    E. coli sepsis ([facility] [A41.51] 11/10/2018    Sepsis following a procedure, initial encounter Meenu Dsouza 11/09/2018    ALEX (acute kidney injury) [facility] [N17.9] 11/09/2018    Lactic acidosis [E87.2] 11/09/2018       Plan:        Septicemia 2/2 prostate biopsy: improving, will DC on Rocephin for 10 days, midline placement today    ALEX: resolved    Lactic acidosis: resolived    BPH S/P TURP    DVT and GI PPx    Will discharge when arrangements complete and ok with other services.   DC [facility]  Follow-up with PCP in one week, Jayna Winchester MD  Notify PCP of discharge      Irving Krause MD  11/12/2018  8:05 AM

## 2018-11-12 NOTE — PLAN OF CARE
Problem: Infection, Septic Shock:  Goal: Will show no infection signs and symptoms  Will show no infection signs and symptoms   Outcome: Ongoing  Patient has remained afebrile and hemodynamically stable   Patient able to tolerate PO fluids well without any nausea or vomiting  Patient continues with stable intake and output.

## 2018-11-12 NOTE — DISCHARGE INSTR - COC
Continuity of Care Form    Patient Name: Carmen Ambrose   :  1945  MRN:  [id number]    Admit date:  2018  Discharge date:  18  Code Status Order: Full Code   Advance Directives:   [facility] Documentation     Date/Time Healthcare Directive Type of Healthcare Directive Copy in [address] Agent's Name Healthcare Agent's Phone Number    18 1815  No, patient does not have an advance directive for healthcare treatment -- -- -- -- --          Admitting Physician:  Sulma Mandel MD  PCP: Didier Damon MD    Discharging Nurse:   [address] Unit/Room#:   Discharging Unit Phone Number: [phone]    Emergency Contact:   Extended Emergency Contact Information  Primary Emergency Contact: \[facility]\"" SERVICES  Address: [address] [place] [address] Phone: [phone]  Mobile Phone: [phone]  Relation: Spouse    Past Surgical History:  Past Surgical History:   Procedure Laterality Date    CARDIAC CATHETERIZATION      LEG SURGERY Left     muscle    [place] BIOPSY OF PROSTATE,INCISIONAL N/A 2018    PROSTATE BIOPSY TRANSRECTAL WITH US performed by Albert Montelongo MD at [facility]       Immunization History: There is no immunization history on file for this patient. Active Problems:  Patient Active Problem List   Diagnosis Code    Sepsis following a procedure, initial encounter T81.44XA    ALEX (acute kidney injury) ([facility] N17.9    Lactic acidosis E87.2    E. coli sepsis ([facility] A41.51       Isolation/Infection:   Isolation          Contact        Patient Infection Status     Infection Encounter Level? Added Added By Resolved Resolved By Review Date Onset Date    MDRO (multi-drug resistant organism) No 18 Piotr Dotson RN        E.  Coli - urine and blood 2018            Nurse Assessment:  Last Vital Signs: /73   Pulse 79   Temp 98.4 °F (36.9 °C) (Oral) Resp 16   Ht 5' 9\" (1.753 m)   Wt 209 lb 4.8 oz (94.9 kg)   SpO2 95%   BMI 30.91 kg/m²     Last documented pain score (0-10 scale): Pain Level: 0  Last Weight:   Wt Readings from Last 1 Encounters:   11/12/18 209 lb 4.8 oz (94.9 kg)     Mental Status:  oriented and alert    IV Access:  Right basilic midline, double port; insertion date 11-. Nursing Mobility/ADLs:  Walking   Independent  Transfer  Independent  Bathing  Independent  Dressing  Independent  Toileting  Independent  Feeding  Independent  Med [address]  Assisted  Med Delivery   whole    Wound Care Documentation and Therapy:        Elimination:  Continence:   · Bowel: Yes  · Bladder: Yes  Urinary Catheter: None   Colostomy/Ileostomy/Ileal Conduit: No       Date of Last BM: 11-12-18    Intake/Output Summary (Last 24 hours) at 11/12/18 0936  Last data filed at 11/12/18 0554   Gross per 24 hour   Intake             5967 ml   Output              675 ml   Net             5292 ml     I/O last 3 completed shifts: In: 6207 [P.O.:1200; I.V.:5007]  Out: 1275 [Urine:1275]    Safety Concerns:     None    Impairments/Disabilities:      None    Nutrition Therapy:  Current Nutrition Therapy:   - Oral Diet:  General    Routes of Feeding: Oral  Liquids: No Restrictions  Daily Fluid Restriction: no  Last Modified Barium Swallow with Video (Video Swallowing Test): not done    Treatments at the Time of Hospital Discharge:   Respiratory Treatments:   Oxygen Therapy:  is not on home oxygen therapy. Ventilator:    - No ventilator support    Rehab Therapies: {THERAPEUTIC INTERVENTION:[id number]}  Weight Bearing Status/Restrictions: No weight bearing restirctions  Other Medical Equipment (for information only, NOT a DME order):    Other Treatments: Skilled nursing assessment, med teaching and compliance, IV Rocephin 2Gm every 24 hours for 10 days, Midline NS flush 10ml before and after ATB and prn     Patient's personal belongings (please select all that are sent with patient):  Glasses    RN SIGNATURE:  Electronically signed by Manuel Ryan RN on 11/12/18 at 9:57 AM    CASE MANAGEMENT/SOCIAL WORK SECTION    Inpatient Status Date: ***    Readmission Risk Assessment Score:  Readmission Risk              Risk of Unplanned Readmission:        13           Discharging to Facility/ Agency   · Name: [facility]  · Address:  · Phone: [phone]  · Fax: [phone]    [place]    Phone: [phone]  Fax: [phone]      / signature: Electronically signed by James Mejía RN on 11/12/18 at 9:51 AM    PHYSICIAN SECTION    Prognosis: Fair    Condition at Discharge: Stable    Rehab Potential (if transferring to Rehab): Good    Recommended Labs or Other Treatments After Discharge:     Physician Certification: I certify the above information and transfer of Milton Bryant  is necessary for the continuing treatment of the diagnosis listed and that he requires Home Care for greater 30 days.      Update Admission H&P: No change in H&P    PHYSICIAN SIGNATURE:  Electronically signed by Mao Oakley MD on 11/12/18 at 11:36 AM

## 2018-11-12 NOTE — PLAN OF CARE
Problem: Physical Regulation:  Goal: Ability to maintain a body temperature in the normal range will improve  Ability to maintain a body temperature in the normal range will improve   Outcome: Met This Shift    Goal: Ability to maintain vital signs within normal range will improve  Ability to maintain vital signs within normal range will improve   Outcome: Met This Shift      Problem: Fluid Volume:  Goal: Maintenance of adequate hydration will improve  Maintenance of adequate hydration will improve   Outcome: Met This Shift      Problem: Pain:  Goal: Pain level will decrease  Pain level will decrease   Outcome: Met This Shift    Goal: Control of acute pain  Control of acute pain   Outcome: Met This Shift      Problem: Gas Exchange - Impaired:  Goal: Levels of oxygenation will improve  Levels of oxygenation will improve   Outcome: Met This Shift      Problem: Infection, Septic Shock:  Goal: Will show no infection signs and symptoms  Will show no infection signs and symptoms   Outcome: Met This Shift      Problem: Tissue Perfusion, Altered:  Goal: Circulatory function within specified parameters  Circulatory function within specified parameters   Outcome: Met This Shift      Problem: Venous Thromboembolism:  Goal: Will show no signs or symptoms of venous thromboembolism  Will show no signs or symptoms of venous thromboembolism   Outcome: Met This Shift    Goal: Absence of signs or symptoms of impaired coagulation  Absence of signs or symptoms of impaired coagulation   Outcome: Met This Shift

## 2018-11-12 NOTE — DISCHARGE SUMMARY
other services. AR with [facility] care  Follow-up with PCP in one week, Tao Gregg MD  Notify PCP of discharge    Significant Diagnostic Studies:       Radiology:    Xr Chest Portable    Result Date: 11/9/2018  EXAMINATION: SINGLE XRAY VIEW OF THE CHEST 11/9/2018 2:12 pm COMPARISON: None. HISTORY: ORDERING SYSTEM PROVIDED HISTORY: fever, chills, post-op TECHNOLOGIST PROVIDED HISTORY: fever, chills, post-op Ordering Physician Provided Reason for Exam: Patient states having sob today Acuity: Unknown Type of Exam: Unknown Additional signs and symptoms: n/a Relevant Medical/Surgical History: n/a FINDINGS: Cardiac silhouette is at the upper limits of normal, could be related to portable technique. No pulmonary venous congestion or edema. No convincing lung consolidation or infiltrate. Osseous structures are grossly intact. No significant intrathoracic abnormality     Us Biopsy Prostate Needle/punch    Result Date: 11/12/2018  Radiology exam is complete. No Radiologist dictation. Please follow up with ordering provider. Consultations:    Consults:     Final Specialist Recommendations/Findings:   IP CONSULT TO HOSPITALIST  IP CONSULT TO UROLOGY  IP CONSULT TO IV TEAM      The patient was seen and examined on day of discharge and this discharge summary is in conjunction with any daily progress note from day of discharge.     Discharge plan:     Disposition: Home with [facility]    Physician Follow Up:     Tao Gregg MD  [address] [facility] [address] [place]le[place] Runner., [place]  [address]  [address] [facility],[address]  [place] [phone]    Go on 11/15/2018  Appointment 11-15-18 at 1:45pm    [address] [facility]  [address]  [address]  [phone]      Home Care       Requiring Further Evaluation/Follow Up

## 2018-11-12 NOTE — CARE COORDINATION
Kim from [employer] in to see pt. OOP for IV Rocephin and supplies is $1050.00 and pt agreeable to starting application for [employer]. Shirlene from [employer] informed of discharge and office will pull MIKE from [facility]. Midline being placed at bedside. IV ATB will be delivered to patients home.

## 2018-11-15 LAB
CULTURE: NORMAL
Lab: NORMAL
SPECIMEN DESCRIPTION: NORMAL
STATUS: NORMAL

## 2023-01-11 ENCOUNTER — TRANSCRIBE ORDERS (OUTPATIENT)
Dept: ADMINISTRATIVE | Age: 78
End: 2023-01-11

## 2023-01-11 DIAGNOSIS — R97.20 RISING PSA LEVEL: Primary | ICD-10-CM

## 2023-01-23 ENCOUNTER — HOSPITAL ENCOUNTER (OUTPATIENT)
Dept: MRI IMAGING | Age: 78
Discharge: HOME | End: 2023-01-25
Payer: MEDICARE

## 2023-01-23 DIAGNOSIS — R97.20 RISING PSA LEVEL: ICD-10-CM

## 2023-01-23 LAB
EGFR, POC: >60 ML/MIN/1.73M2
POC BUN: 23 MG/DL (ref 8–26)
POC CREATININE: 1.2 MG/DL (ref 0.51–1.19)

## 2023-01-23 PROCEDURE — 2580000003 HC RX 258: Performed by: UROLOGY

## 2023-01-23 PROCEDURE — 6360000004 HC RX CONTRAST MEDICATION: Performed by: UROLOGY

## 2023-01-23 PROCEDURE — A9579 GAD-BASE MR CONTRAST NOS,1ML: HCPCS | Performed by: UROLOGY

## 2023-01-23 PROCEDURE — 82565 ASSAY OF CREATININE: CPT

## 2023-01-23 PROCEDURE — 72197 MRI PELVIS W/O & W/DYE: CPT

## 2023-01-23 PROCEDURE — 84520 ASSAY OF UREA NITROGEN: CPT

## 2023-01-23 RX ORDER — 0.9 % SODIUM CHLORIDE 0.9 %
20 INTRAVENOUS SOLUTION INTRAVENOUS ONCE
Status: COMPLETED | OUTPATIENT
Start: 2023-01-23 | End: 2023-01-23

## 2023-01-23 RX ORDER — SODIUM CHLORIDE 0.9 % (FLUSH) 0.9 %
10 SYRINGE (ML) INJECTION PRN
Status: DISCONTINUED | OUTPATIENT
Start: 2023-01-23 | End: 2023-01-26 | Stop reason: HOSPADM

## 2023-01-23 RX ADMIN — SODIUM CHLORIDE 20 ML: 9 INJECTION, SOLUTION INTRAVENOUS at 09:04

## 2023-01-23 RX ADMIN — GADOTERIDOL 18 ML: 279.3 INJECTION, SOLUTION INTRAVENOUS at 09:03

## (undated) DEVICE — SYRINGE MED 50ML LUERLOCK TIP

## (undated) DEVICE — STERILE PREPRINTED LABELS W/ D: Brand: MEDLINE

## (undated) DEVICE — MAX-CORE® DISPOSABLE CORE BIOPSY INSTRUMENT, 18G X 20CM: Brand: MAX-CORE

## (undated) DEVICE — TOWEL,OR,DSP,ST,BLUE,STD,4/PK,20PK/CS: Brand: MEDLINE

## (undated) DEVICE — GLOVE SURG SZ 75 L12IN FNGR THK87MIL WHT LTX FREE

## (undated) DEVICE — Device

## (undated) DEVICE — PAD N ADH W3XL4IN POLY COT SFT PERF FLM EASILY CUT ABSRB